# Patient Record
Sex: FEMALE | Race: WHITE | HISPANIC OR LATINO | URBAN - METROPOLITAN AREA
[De-identification: names, ages, dates, MRNs, and addresses within clinical notes are randomized per-mention and may not be internally consistent; named-entity substitution may affect disease eponyms.]

---

## 2022-12-21 ENCOUNTER — APPOINTMENT (RX ONLY)
Dept: URBAN - METROPOLITAN AREA CLINIC 98 | Facility: CLINIC | Age: 47
Setting detail: DERMATOLOGY
End: 2022-12-21

## 2022-12-21 DIAGNOSIS — D17 BENIGN LIPOMATOUS NEOPLASM: ICD-10-CM

## 2022-12-21 PROBLEM — D17.1 BENIGN LIPOMATOUS NEOPLASM OF SKIN AND SUBCUTANEOUS TISSUE OF TRUNK: Status: ACTIVE | Noted: 2022-12-21

## 2022-12-21 PROCEDURE — ? DEFER

## 2022-12-21 PROCEDURE — 99202 OFFICE O/P NEW SF 15 MIN: CPT

## 2022-12-21 PROCEDURE — ? COUNSELING

## 2022-12-21 ASSESSMENT — LOCATION ZONE DERM: LOCATION ZONE: TRUNK

## 2022-12-21 ASSESSMENT — LOCATION SIMPLE DESCRIPTION DERM: LOCATION SIMPLE: LEFT LOWER BACK

## 2022-12-21 ASSESSMENT — LOCATION DETAILED DESCRIPTION DERM: LOCATION DETAILED: LEFT SUPERIOR LATERAL LOWER BACK

## 2022-12-21 NOTE — PROCEDURE: DEFER
X Size Of Lesion In Cm (Optional): 0
Detail Level: Detailed
Procedure To Be Performed At Next Visit: Excision
Introduction Text (Please End With A Colon): The following procedure was deferred:
Instructions (Optional): Patient will call back to schedule surgery with Dr MONAE if lipoma becomes bothersome